# Patient Record
Sex: FEMALE | Race: WHITE | NOT HISPANIC OR LATINO | Employment: OTHER | URBAN - METROPOLITAN AREA
[De-identification: names, ages, dates, MRNs, and addresses within clinical notes are randomized per-mention and may not be internally consistent; named-entity substitution may affect disease eponyms.]

---

## 2017-01-06 ENCOUNTER — HISTORICAL (OUTPATIENT)
Dept: RADIOLOGY | Facility: HOSPITAL | Age: 72
End: 2017-01-06

## 2017-04-18 ENCOUNTER — HISTORICAL (OUTPATIENT)
Dept: RADIOLOGY | Facility: HOSPITAL | Age: 72
End: 2017-04-18

## 2018-05-16 ENCOUNTER — HISTORICAL (OUTPATIENT)
Dept: RADIOLOGY | Facility: HOSPITAL | Age: 73
End: 2018-05-16

## 2018-05-22 ENCOUNTER — HISTORICAL (OUTPATIENT)
Dept: RADIOLOGY | Facility: HOSPITAL | Age: 73
End: 2018-05-22

## 2018-07-05 ENCOUNTER — HISTORICAL (OUTPATIENT)
Dept: RADIOLOGY | Facility: HOSPITAL | Age: 73
End: 2018-07-05

## 2018-07-05 LAB
BUN SERPL-MCNC: 11 MG/DL (ref 7–18)
POC CREATININE: 0.8 MG/DL (ref 0.6–1.3)

## 2018-11-19 ENCOUNTER — HISTORICAL (OUTPATIENT)
Dept: RADIOLOGY | Facility: HOSPITAL | Age: 73
End: 2018-11-19

## 2018-12-05 ENCOUNTER — HISTORICAL (OUTPATIENT)
Dept: RADIOLOGY | Facility: HOSPITAL | Age: 73
End: 2018-12-05

## 2019-05-27 ENCOUNTER — HISTORICAL (OUTPATIENT)
Dept: RADIOLOGY | Facility: HOSPITAL | Age: 74
End: 2019-05-27

## 2019-09-29 ENCOUNTER — HOSPITAL ENCOUNTER (OUTPATIENT)
Dept: MEDSURG UNIT | Facility: HOSPITAL | Age: 74
End: 2019-09-30
Attending: FAMILY MEDICINE | Admitting: FAMILY MEDICINE

## 2019-09-29 LAB
ABS NEUT (OLG): 4.8 X10(3)/MCL (ref 1.5–6.9)
ALBUMIN SERPL-MCNC: 4 GM/DL (ref 3.4–5)
ALBUMIN/GLOB SERPL: 1.1 RATIO
ALP SERPL-CCNC: 67 UNIT/L (ref 30–113)
ALT SERPL-CCNC: 29 UNIT/L (ref 10–45)
APPEARANCE, UA: CLEAR
APTT PPP: 26.8 SECOND(S) (ref 25–35)
AST SERPL-CCNC: 21 UNIT/L (ref 15–37)
BACTERIA SPEC CULT: NORMAL /HPF
BASOPHILS # BLD AUTO: 0.1 X10(3)/MCL (ref 0–0.1)
BASOPHILS NFR BLD AUTO: 2 % (ref 0–1)
BILIRUB SERPL-MCNC: 0.2 MG/DL (ref 0.1–0.9)
BILIRUB UR QL STRIP: NEGATIVE
BILIRUBIN DIRECT+TOT PNL SERPL-MCNC: <0.05 MG/DL (ref 0–0.3)
BILIRUBIN DIRECT+TOT PNL SERPL-MCNC: >0.15 MG/DL
BUN SERPL-MCNC: 13 MG/DL (ref 10–20)
CALCIUM SERPL-MCNC: 9.6 MG/DL (ref 8–10.5)
CHLORIDE SERPL-SCNC: 102 MMOL/L (ref 100–108)
CK MB SERPL-MCNC: 2.7 NG/ML (ref 0–3.6)
CK SERPL-CCNC: 188 UNIT/L (ref 39–225)
CO2 SERPL-SCNC: 30 MMOL/L (ref 21–35)
COLOR UR: ABNORMAL
CREAT SERPL-MCNC: 1.31 MG/DL (ref 0.7–1.3)
D DIMER PPP IA.FEU-MCNC: 0.37 MG/L
EOSINOPHIL # BLD AUTO: 0.5 X10(3)/MCL (ref 0–0.6)
EOSINOPHIL NFR BLD AUTO: 5 % (ref 0–5)
ERYTHROCYTE [DISTWIDTH] IN BLOOD BY AUTOMATED COUNT: 14.6 % (ref 11.5–17)
GLOBULIN SER-MCNC: 3.7 GM/DL
GLUCOSE (UA): NEGATIVE
GLUCOSE SERPL-MCNC: 123 MG/DL (ref 75–116)
HCT VFR BLD AUTO: 41.3 % (ref 36–48)
HGB BLD-MCNC: 14 GM/DL (ref 12–16)
HGB UR QL STRIP: NEGATIVE
IMM GRANULOCYTES # BLD AUTO: 0.02 10*3/UL (ref 0–0.02)
IMM GRANULOCYTES NFR BLD AUTO: 0.2 % (ref 0–0.43)
INR PPP: 0.9 (ref 0–1.2)
KETONES UR QL STRIP: NEGATIVE
LEUKOCYTE ESTERASE UR QL STRIP: ABNORMAL
LYMPHOCYTES # BLD AUTO: 3.3 X10(3)/MCL (ref 0.5–4.1)
LYMPHOCYTES NFR BLD AUTO: 34 % (ref 15–40)
MAGNESIUM SERPL-MCNC: 2.1 MG/DL (ref 1.8–2.4)
MCH RBC QN AUTO: 29 PG (ref 27–34)
MCHC RBC AUTO-ENTMCNC: 34 GM/DL (ref 31–36)
MCV RBC AUTO: 87 FL (ref 80–99)
MONOCYTES # BLD AUTO: 0.9 X10(3)/MCL (ref 0–1.1)
MONOCYTES NFR BLD AUTO: 9 % (ref 4–12)
NEUTROPHILS # BLD AUTO: 4.8 X10(3)/MCL (ref 1.5–6.9)
NEUTROPHILS NFR BLD AUTO: 50 % (ref 43–75)
NITRITE UR QL STRIP: NEGATIVE
PH UR STRIP: 6.5 [PH]
PLATELET # BLD AUTO: 259 X10(3)/MCL (ref 140–400)
PMV BLD AUTO: 9.6 FL (ref 6.8–10)
POC TROPONIN: 0 NG/ML (ref 0–0.08)
POTASSIUM SERPL-SCNC: 3.1 MMOL/L (ref 3.6–5.2)
PROT SERPL-MCNC: 7.7 GM/DL (ref 6.4–8.2)
PROT UR QL STRIP: NEGATIVE
PROTHROMBIN TIME: 9.4 SECOND(S) (ref 9–12)
RBC # BLD AUTO: 4.77 X10(6)/MCL (ref 4.2–5.4)
RBC #/AREA URNS HPF: NORMAL /[HPF]
SODIUM SERPL-SCNC: 141 MMOL/L (ref 135–145)
SP GR UR STRIP: <=1.005
SQUAMOUS EPITHELIAL, UA: NORMAL /LPF
TROPONIN I SERPL-MCNC: <0.017 NG/ML (ref 0–0.06)
TSH SERPL-ACNC: 5.52 MIU/ML (ref 0.36–3.74)
UROBILINOGEN UR STRIP-ACNC: 0.2 EU/DL
WBC # SPEC AUTO: 9.6 X10(3)/MCL (ref 4.5–11.5)
WBC #/AREA URNS HPF: NORMAL /HPF

## 2019-09-30 LAB
BUN SERPL-MCNC: 14 MG/DL (ref 10–20)
CALCIUM SERPL-MCNC: 9.5 MG/DL (ref 8–10.5)
CHLORIDE SERPL-SCNC: 102 MMOL/L (ref 100–108)
CK MB SERPL-MCNC: 2.4 NG/ML (ref 0–3.6)
CK MB SERPL-MCNC: 2.4 NG/ML (ref 0–3.6)
CK SERPL-CCNC: 154 UNIT/L (ref 39–225)
CK SERPL-CCNC: 158 UNIT/L (ref 39–225)
CO2 SERPL-SCNC: 34 MMOL/L (ref 21–35)
CREAT SERPL-MCNC: 1.03 MG/DL (ref 0.7–1.3)
CREAT/UREA NIT SERPL: 14
GLUCOSE SERPL-MCNC: 104 MG/DL (ref 75–116)
POTASSIUM SERPL-SCNC: 3.4 MMOL/L (ref 3.6–5.2)
SODIUM SERPL-SCNC: 142 MMOL/L (ref 135–145)
TROPONIN I SERPL-MCNC: <0.017 NG/ML (ref 0–0.06)
TROPONIN I SERPL-MCNC: <0.017 NG/ML (ref 0–0.06)

## 2019-10-02 LAB — FINAL CULTURE: NO GROWTH

## 2020-06-24 ENCOUNTER — HISTORICAL (OUTPATIENT)
Dept: RADIOLOGY | Facility: HOSPITAL | Age: 75
End: 2020-06-24

## 2020-08-20 ENCOUNTER — HISTORICAL (OUTPATIENT)
Dept: RADIOLOGY | Facility: HOSPITAL | Age: 75
End: 2020-08-20

## 2021-03-17 ENCOUNTER — HISTORICAL (OUTPATIENT)
Dept: RADIOLOGY | Facility: HOSPITAL | Age: 76
End: 2021-03-17

## 2021-06-14 ENCOUNTER — HISTORICAL (OUTPATIENT)
Dept: RADIOLOGY | Facility: HOSPITAL | Age: 76
End: 2021-06-14

## 2021-09-23 ENCOUNTER — HISTORICAL (OUTPATIENT)
Dept: RADIOLOGY | Facility: HOSPITAL | Age: 76
End: 2021-09-23

## 2022-01-11 ENCOUNTER — HISTORICAL (OUTPATIENT)
Dept: RADIOLOGY | Facility: HOSPITAL | Age: 77
End: 2022-01-11

## 2022-04-11 ENCOUNTER — HISTORICAL (OUTPATIENT)
Dept: ADMINISTRATIVE | Facility: HOSPITAL | Age: 77
End: 2022-04-11
Payer: COMMERCIAL

## 2022-04-28 VITALS
HEIGHT: 62 IN | DIASTOLIC BLOOD PRESSURE: 83 MMHG | BODY MASS INDEX: 26.13 KG/M2 | WEIGHT: 142 LBS | SYSTOLIC BLOOD PRESSURE: 138 MMHG | OXYGEN SATURATION: 98 %

## 2022-04-30 NOTE — DISCHARGE SUMMARY
Patient is a 73-year-old white female.  __________ diagnoses include palpitations, tachycardia, chest pressure, hypertension, hypothyroidism.    DISCHARGE DIAGNOSES:  Include:   1. Hypothyroidism with dose of Synthroid changed.  2. Palpitations, resolved.  3. Hypokalemia, resolved.  4. Chronic obstructive pulmonary disease.  5. Tachycardia and palpitations, resolved.  6. Chest pressure, resolved.    HOSPITAL COURSE:  Patient is a 73-year-old white female who presented to the emergency room with history of episode of chest pressure and some tachycardia.  Patient was having this episode for a short amount of time prior.  She had her daughter drive her to the hospital.  AFib was helped by the time she got there.  She was admitted for observation.  Cardiac enzymes were negative.  She was found to be hypokalemic.  This was reconstituted with oral and IV potassium.  She was having cramps in her legs.  This was resolved with the administration of potassium.  She was sent home on oral potassium.  Her thyroid, which she already takes Synthroid for, her TSH was elevated.  Her dose was changed to 125 rather than 100.  Her TSH and potassium are to be checked as an outpatient.  She was having palpitations.  There was nothing shown on her Holter monitor.  She is to follow up with her cardiologist, Dr. Brunner with a Holter monitor prior to her appointment.  She has an appointment in early November.  She was asymptomatic throughout her stay.  Smoking cessation is encouraged.  Patient is not interested in smoking cessation at this time.        PETR   DD: 10/22/2019 0330   DT: 10/22/2019 0343  Job # [id number]     
eyes open, moving, responding to tactile and auditory stimulation appropriately

## 2022-04-30 NOTE — ED PROVIDER NOTES
"   Patient:   Ananya Seasr            MRN: [id number]            FIN: [id number]               Age:   73 years     Sex:  Female     :  1945   Associated Diagnoses:   HTN (hypertension); Palpitations; Chest pressure   Author:   Manuel Sumner MD      Basic Information   Time seen: Date & time 2019 21:14:00.   History source: Patient.   Arrival mode: Private vehicle, walking.   History limitation: None.   Additional information: Chief Complaint from Nursing Triage Note : Chief Complaint   2019 21:04 CDT      Chief Complaint           Took blood pressure at home and was concerned about the heart rate that went from 99 to 130s.  Feeling like a "les in head was pounding".  BP at home was 162/95.  Heart rate stayed in 70s and low 80s throughout triage.  (Modified) .      History of Present Illness   The patient presents with Patient states 2-3 hours ago had an acute onset of palpitations while at rest felt that her heart was racing and that her blood pressure was elevated.  By the time the patient presented emergency department symptoms had eased.  Patient denies chest pain but states she did have some chest pressure and some throat tightness and some dyspnea..  The onset was 2  hours ago.  The course/duration of symptoms is improving.  Character of symptoms fast.  The degree at onset was moderate.  The degree at present is minimal.  The exacerbating factor is none.  The relieving factor is none.  Risk factors consist of hypertension, smoking and COPD.  Prior episodes: none.  Therapy today: none.  Associated symptoms: shortness of breath.        Review of Systems   Constitutional symptoms:  Negative except as documented in HPI.   Skin symptoms:  Negative except as documented in HPI.   Eye symptoms:  Negative except as documented in HPI.   ENMT symptoms:  Negative except as documented in HPI.   Respiratory symptoms:  Negative except as documented in HPI.   Cardiovascular symptoms:  Negative " except as documented in HPI.   Gastrointestinal symptoms:  Negative except as documented in HPI.   Genitourinary symptoms:  Negative except as documented in HPI.   Musculoskeletal symptoms:  Negative except as documented in HPI.   Neurologic symptoms:  Negative except as documented in HPI.   Psychiatric symptoms:  Negative except as documented in HPI.             Additional review of systems information: All other systems reviewed and otherwise negative.      Health Status   Allergies:    Allergic Reactions (Selected)  Severity Not Documented  Augmentin- No reactions were documented.  MetroNIDAZOLE- No reactions were documented.  Penicillins- Sulfa drugs.  Sulfa drugs- No reactions were documented.  Sulfonamides- No reactions were documented.,    Allergies (5) Active Reaction  Augmentin None Documented  metroNIDAZOLE None Documented  penicillins sulfa drugs  sulfa drugs None Documented  sulfonamides None Documented  .   Medications:  (Selected)   Prescriptions  Prescribed  Anoro Ellipta 62.5 mcg-25 mcg/inh inhalation powder: 1 puff(s), INH, Daily, # 30 EA, 4 Refill(s)  CeleBREX 100 mg oral capsule: 100 mg = 1 cap(s), Oral, BID, # 60 cap(s), 2 Refill(s), Pharmacy: [facility]  ProAir HFA 90 mcg/inh inhalation aerosol: See Instructions, INHALE TWO PUFFS BY MOUTH 4 TIMES DAILY, # 9 unknown unit, 2 Refill(s), eRx: [facility]  Vitamin D3 50,000 intl units oral capsule: See Instructions, TAKE ONE CAPSULE BY MOUTH WEEKLY, # 4 cap(s), 11 Refill(s), eRx: [id number]  amLODIPine 5 mg oral tablet: See Instructions, TAKE 1 TABLET BY MOUTH AT BEDTIME, # 30 tab(s), 5 Refill(s), eRx: [facility]  buPROPion 300 mg/24 hours (XL) oral tablet, extended release: See Instructions, TAKE 1 TABLET BY MOUTH ONCE DAILY, # 90 tab(s), 3 Refill(s), eRx: [facility]  diclofenac potassium 50 mg oral tablet: 50 mg = 1 tab(s), Oral, q6hr, # 60 tab(s), 0 Refill(s), Pharmacy: [facility]  #[id number]  donepezil 5 mg oral tablet: 5 mg = 1 tab(s), Oral, Once a day (at bedtime), # 90 tab(s), 0 Refill(s), Pharmacy: [id number]  donepezil 5 mg oral tablet: See Instructions, TAKE ONE TABLET BY MOUTH IN THE AFTERNOON, # 90 tab(s), 3 Refill(s), Pharmacy: [facility]  eszopiclone 1 mg oral tablet: 1 mg = 1 tab(s), Oral, Once a day (at bedtime), # 30 tab(s), 2 Refill(s)  fluocinolone 0.01% otic solution: 5 drop(s), Ear-Left, BID, PRN PRN itching, # 20 mL, 1 Refill(s), Pharmacy: [facility]  hydrochlorothiazide-lisinopril 12.5 mg-20 mg oral tablet: 1 tab(s), Oral, BID, # 60 tab(s), 5 Refill(s), Pharmacy: [id number]  levothyroxine 100 mcg (0.1 mg) oral tablet: See Instructions, TAKE 1 TABLET BY MOUTH ONCE DAILY, # 30 tab(s), 5 Refill(s), Pharmacy: [facility]  pravastatin 40 mg oral tablet: 40 mg = 1 tab(s), Oral, Daily, # 30 tab(s), 5 Refill(s), Pharmacy: [facility]  Documented Medications  Documented  B-50 Complex oral capsule: See Instructions, 1 CAPSULE DAILY, 0 Refill(s)  spironolactone 25 mg oral tablet: See Instructions, 1/2  tab(s) Oral every MONDAY, WEDNESDAY, AND FRIDAY, 0 Refill(s).      Past Medical/ Family/ Social History   Medical history:    Resolved  Hypertension (35736325):  Resolved.  Hip pain (36519215):  Resolved.  COPD (Chronic Obstructive Pulmonary Disease) Assessment Test scale (9793530526):  Resolved..   Surgical history:    Extn - Extraction of tooth ([id number]) in the month of 2017 at 71 Years.  Comments:  2017 9:27 GALI - Milana Duckworth LPN  x6  Cataract extraction ([id number]).  Comments:  2016 9:41 CST - Nikky WRIGHT, Sridevi BILLS  left  CHRIS BSO - Total abdominal hysterectomy and bilateral salpingo-oophorectomy ([id number]).   section ([id number]).  Cholecystectomy (81454974).  Appendectomy (893710317).  Tonsillectomy and adenoidectomy (112918263).  basel cell removal..   Family history:     CAD - Coronary artery disease  Mother  Father  Hypertension.  Mother  .   Social history:    Social & Psychosocial Habits    Alcohol    Comment: yogeshies - 02/29/2016 09:43 - Sridevi Sher LPN    10/26/2014 Risk Assessment: Denies Alcohol Use    Employment/School  06/20/2016  Status: Retired    Exercise    Comment: as tolerated - 06/20/2016 14:16 - Milana Duckworth LPN    Home/Environment  06/22/2016  Living situation: Home/Independent    Home equipment: Respiratory treatments    Alcohol abuse in household: No    Substance abuse in household: No    Smoker in household: No    Injuries/Abuse/Neglect in household: No    Feels unsafe at home: No    Safe place to go: Yes    Family/Friends available to help: Yes    Concern for family members at home: No    Nutrition/Health  06/20/2016  Type of diet: Regular    Sexual  06/22/2016  Sexually active: Yes    Substance Use    Comment: curry - 02/29/2016 09:43 - Sridevi Sher LPN    10/26/2014 Risk Assessment: Denies Substance Abuse    Tobacco  10/26/2014 Risk Assessment: Denies Tobacco Use    10/04/2016  Use: Former smoker    Type: Cigarettes    Tobacco use per day: 10    09/29/2019  Use: 10 or more cigarettes (1/    Patient Wants Consult For Cessation Counseling No    Abuse/Neglect  09/29/2019  SHX Any signs of abuse or neglect No  , Tobacco use: Regularly.   Problem list:    Active Problems (9)  Abdominal pain   COPD - Chronic obstructive pulmonary disease   Depression   Hyperlipidemia   Hypertension   Hypothyroidism   Mild cognitive impairment   Tobacco user   Vitamin D deficiency   .      Physical Examination               Vital Signs   Vital Signs   9/29/2019 21:04 CDT      Temperature Oral          36.7 DegC                             Temperature Oral (calculated)             98.06 DegF                             Peripheral Pulse Rate     77 bpm                             Respiratory Rate          20 br/min                             SpO2                       95 %                             Oxygen Therapy            Room air                             Systolic Blood Pressure   192 mmHg  HI                             Diastolic Blood Pressure  78 mmHg  .      Vital Signs (last 24 hrs)_____  Last Charted___________  Temp Oral     36.7 DegC  (SEP 29 21:04)  Heart Rate Peripheral   77 bpm  (SEP 29 21:04)  Resp Rate         20 br/min  (SEP 29 21:04)  SBP      H 192mmHg  (SEP 29 21:04)  DBP      78 mmHg  (SEP 29 21:04)  SpO2      95 %  (SEP 29 21:04)  Weight      65 kg  (SEP 29 21:04)  Height      159 cm  (SEP 29 21:04)  BMI      25.71  (SEP 29 21:04)  .   Measurements   9/29/2019 21:04 CDT      Weight Dosing             65 kg                             Weight Measured           65 kg                             Weight Measured and Calculated in Lbs     143.30 lb                             Height/Length Dosing      159 cm                             Height/Length Measured    159 cm                             Body Mass Index Measured  25.71 kg/m2  .   Basic Oxygen Information   9/29/2019 21:04 CDT      SpO2                      95 %                             Oxygen Therapy            Room air  .   General:  Alert, no acute distress.    Skin:  Warm, dry, pink, intact.    Head:  Normocephalic, atraumatic.    Neck:  Supple, trachea midline, no tenderness, no JVD.    Eye:  Pupils are equal, round and reactive to light, extraocular movements are intact, normal conjunctiva.    Cardiovascular:  Regular rate and rhythm, No murmur, Normal peripheral perfusion, No edema.    Respiratory:  Lungs are clear to auscultation, respirations are non-labored, breath sounds are equal.    Chest wall:  No tenderness, No deformity.    Back:  Nontender, Normal range of motion, Normal alignment.    Musculoskeletal:  Normal ROM, normal strength, no tenderness, no swelling, no deformity.    Gastrointestinal:  Soft, Nontender, Non distended, Normal bowel sounds, No organomegaly.    Neurological:   Alert and oriented to person, place, time, and situation, No focal neurological deficit observed.    Lymphatics:  No lymphadenopathy.   Psychiatric:  Cooperative, appropriate mood & affect, normal judgment.       Medical Decision Making   Orders  Launch Orders   Laboratory:  BNP-Pro (Order): Stat collect, 9/29/2019 21:28 CDT, Blood, Lab Collect, 9/29/2019 21:28 CDT  D-Dimer (Order): Stat collect, 9/29/2019 21:28 CDT, Blood, Lab Collect, 9/29/2019 21:28 CDT  Urinalysis with Microscopic if Indicated (Order): Stat collect, Urine, 9/29/2019 21:28 CDT, Nurse collect  POC iSTAT ER Troponin request (Order): Blood, Stat collect, 9/29/2019 21:28 CDT, Nurse collect, Print Label By Order Location  Magnesium Level (Order): Stat collect, 9/29/2019 21:28 CDT, Blood, Lab Collect, 9/29/2019 21:28 CDT  CK MB (Order): Stat collect, 9/29/2019 21:28 CDT, Blood, Lab Collect, 9/29/2019 21:28 CDT  CK (Order): Stat collect, 9/29/2019 21:28 CDT, Blood, Lab Collect, 9/29/2019 21:28 CDT  PTT (Order): Stat collect, 9/29/2019 21:28 CDT, Blood, Lab Collect, 9/29/2019 21:28 CDT  INR - Protime (Order): Stat collect, 9/29/2019 21:28 CDT, Blood, Lab Collect, 9/29/2019 21:28 CDT  CMP (Order): Stat collect, 9/29/2019 21:28 CDT, Blood, Lab Collect, 9/29/2019 21:28 CDT  CBC w/ Auto Diff (Order): Now collect, 9/29/2019 21:28 CDT, Blood, Lab Collect, 9/29/2019 21:28 CDT  Radiology:  XR Chest 1 View (Order): Stat, 9/29/2019 21:28 CDT, Chest Pain, None, Stretcher, Rad Type, Not Scheduled  Cardiology:  EKG Adult 12 Lead (Order): 9/29/2019 21:28 CDT, Stat, -1, -1, 9/29/2019 21:28 CDT.   Electrocardiogram:  Time 9/29/2019 21:46:00, rate 67, normal sinus rhythm, STT segments Non specific changes, QRS interval Incomplete right bundle-branch block, Previous EKG available None available.    Results review:     No qualifying data available.   Radiology results:  09/29/2019 23:00; Burak CONNOLLY, Manuel MILES; ; Nothing acute.      Reexamination/ Reevaluation   Vital signs    Basic Oxygen Information   9/29/2019 21:04 CDT      SpO2                      95 %                             Oxygen Therapy            Room air        Impression and Plan   Diagnosis   HTN (hypertension) (GQL41-XR I10)   Palpitations (XLV54-LS R00.2)   Chest pressure (IKQ14-TV R07.89)      Calls-Consults   -  9/29/2019 23:10:00 , Yojana PALOMINO MD, Gideon Dudley to admit.    Plan   Condition: Improved, Stable.    Disposition: Admit time  9/29/2019 23:10:00, Place in Observation Telemetry Unit.    Counseled: Patient, Family, Regarding diagnosis, Regarding diagnostic results, Regarding treatment plan, Patient indicated understanding of instructions.    Orders: Launch Orders   Laboratory:  CPK (Order): Timed collect, 9/30/2019 5:00 CDT, Blood, q6hr for 3 dose(s), Stop date 9/30/2019 22:59 CDT, Lab Collect, 9/30/2019 5:00 CDT  Troponin-I (Order): Timed collect, 9/30/2019 5:00 CDT, Blood, q6hr for 3 dose(s), Stop date 9/30/2019 22:59 CDT, Lab Collect, 9/30/2019 5:00 CDT  CK MB (Order): Timed collect, 9/30/2019 5:00 CDT, Blood, q6hr for 3 dose(s), Stop date 9/30/2019 22:59 CDT, Lab Collect, 9/30/2019 5:00 CDT  Admit/Transfer/Discharge:  Place in Outpatient Observation (Order): 9/29/2019 23:11 CDT, Telemetry with Monitor Juno CONNOLLY, Britt Louie, No.

## 2022-05-04 NOTE — HISTORICAL [facility] CERNER
This is a historical note converted from Teresita. Formatting and pictures may have been removed.  Please reference Teresita for original formatting and attached multimedia. Chief Complaint  Took blood pressure at home and was concerned about the heart rate that went from 99 to 130s. ?Feeling like a les in head was pounding. ?BP at home was 162/95. ?Heart rate stayed in 70s and low 80s throughout triage.  History of Present Illness  Mr. Zuniga is a 73-year-old white female has a history of hypertension?she also has a history of hyperlipidemia.? She does see Dr. Brunner as her cardiologist. ?She was at home she began to feel like her she had a throbbing in her head. ?She felt like she had palpitations. ?She took her pulse and it was around 100. ?This continued and her pulse was around 130. ?She was brought to the ER by private car.? Her?pulse had?come down. ?Her blood pressure was elevated.? She did not require anything to bring down her pulse. ?She was put in for observation. ?She was placed on telemetry. ?She did not have any more runs of elevated pulse?during her stay. ?Her TSH was noted to be elevated at 5. ?She was placed on metoprolol during her stay. ?Her potassium was low. ?At 3.? She did note some cramping to her legs.? She does take lisinopril hydrochlorothiazide. ?She may be depleted in her potassium secondary to the hydrochlorothiazide.  Review of Systems  Constitutional:?no fever, posfatigue,? positive weakness  Eye:?no vision loss, eye redness, drainage, or pain  ENMT:?no sore throat, ear pain, sinus pain/congestion, nasal congestion/drainage  Respiratory:?no cough, no wheezing, no shortness of breath  Cardiovascular:?no chest pain,?+ palpitations, no edemap positive pounding in head? when her? heartbeat was? high  Gastrointestinal:?no nausea, vomiting, or diarrhea. No abdominal pain  Genitourinary:?no dysuria, no urinary frequency or urgency, no hematuria  Hema/Lymph:?no abnormal bruising or  bleeding  Endocrine:?no heat or cold intolerance, no excessive thirst or excessive urination  Musculoskeletal:?no muscle or joint pain, no joint swelling  Integumentary:?no skin rash or abnormal lesion  Neurologic: no headache, no dizziness, no weakness or numbness  ?  Physical Exam  General:?well-developed well-nourished in no acute distress  Eye: PERRLA, EOMI, clear conjunctiva, eyelids normal  HENT:?TMs/ear canals clear, oropharynx without erythema/exudate, oropharynx and nasal mucosal surfaces moist, no maxillary/frontal sinus tenderness to palpation  Neck: full range of motion, no thyromegaly or lymphadenopathy  Respiratory:?clear to auscultation bilaterally  Cardiovascular:?regular rate and rhythm without murmurs, gallops or rubs  Gastrointestinal:?soft, non-tender, non-distended with normal bowel sounds, without masses to palpation  Genitourinary: no CVA tenderness to palpation  Musculoskeletal:?full range of motion of all extremities/spine without limitation or discomfort  Integumentary: no rashes or skin lesions present  Neurologic: cranial nerves intact, no signs of peripheral neurological deficit, motor/sensory function intact  ?  Assessment/Plan  1.?Hypokalemic syndrome?E87.6  ?Replacing her potassium with oral potassium  2.?Hypothyroid?E03.9  She is already on levothyroxine 100. ?She is not fully supplemented her TSH is?greater than 5 will increase to 125  3.?COPD - Chronic obstructive pulmonary disease?J44.9  Continue inhalers  4.?Tobacco user?Z72.0  Encouraged to quit smoking,?she assures me that this will probably not happen  5.?Palpitation?R00.2  She had some tachycardia?we will start her on metoprolol, Holter as an outpatient, follow-up with her cardiologist Dr. Brunner  6.?Tachycardia?R00.0  See above   Problem List/Past Medical History  Ongoing  Abdominal pain  COPD - Chronic obstructive pulmonary disease  Depression  Hyperlipidemia  Hypertension  Hypothyroidism  Mild cognitive impairment  Tobacco  user  Vitamin D deficiency  Historical  COPD (Chronic Obstructive Pulmonary Disease) Assessment Test scale  Hip pain  Hypertension  Procedure/Surgical History  Extn - Extraction of tooth ()  Closed reduction of dislocation of foot and toe (10/26/2014)  Closed treatment of metatarsophalangeal joint dislocation; without anesthesia. (10/26/2014)  Appendectomy  basel cell removal  Cataract extraction   section  Cholecystectomy  CHRIS BSO - Total abdominal hysterectomy and bilateral salpingo-oophorectomy  Tonsillectomy and adenoidectomy   Medications  Inpatient  No active inpatient medications  Home  amLODIPine 5 mg oral tablet, See Instructions, 5 refills  Anoro Ellipta 62.5 mcg-25 mcg/inh inhalation powder, 1 puff(s), INH, Daily, 4 refills  B-50 Complex oral capsule, See Instructions  biotin 1000 mcg oral tablet, 1000 mcg= 1 tab(s), Oral, Daily  buPROPion 300 mg/24 hours (XL) oral tablet, extended release, See Instructions, 3 refills  donepezil 5 mg oral tablet, See Instructions, 3 refills  donepezil 5 mg oral tablet, 5 mg= 1 tab(s), Oral, Daily  fluocinolone 0.01% otic solution, 5 drop(s), Ear-Left, BID, PRN, 1 refills  hydrochlorothiazide-lisinopril 12.5 mg-20 mg oral tablet, 1 tab(s), Oral, BID, 5 refills  levothyroxine 100 mcg (0.1 mg) oral tablet, See Instructions, 5 refills  methylsulfonylmethane, 1000 mg, Oral, Daily  Omega-3 oral capsule, 1280 mg, Oral, Daily  potassium chloride 15 mEq oral tablet, extended release, 15 mEq= 1 tab(s), Oral, Daily  ProAir HFA 90 mcg/inh inhalation aerosol, See Instructions, 2 refills,? ?Not taking  Allergies  Augmentin  metroNIDAZOLE  penicillins?(sulfa drugs)  sulfa drugs  sulfonamides  Social History  Abuse/Neglect  No, 2019  No, 2019  Alcohol - Denies Alcohol Use, 10/26/2014  Employment/School  Retired, 2016  Exercise  Home/Environment  Living situation: Home/Independent. Home equipment: Respiratory treatments. Alcohol abuse in household: No.  Substance abuse in household: No. Smoker in household: No. Injuries/Abuse/Neglect in household: No. Feels unsafe at home: No. Safe place to go: Yes. Family/Friends available for support: Yes. Concern for family members at home: No., 06/22/2016  Nutrition/Health  Regular, 06/20/2016  Sexual  Sexually active: Yes., 06/22/2016  Substance Use - Denies Substance Abuse, 10/26/2014  Tobacco - Denies Tobacco Use, 10/26/2014  10 or more cigarettes (1/2 pack or more)/day in last 30 days, No, 09/30/2019  10 or more cigarettes (1/2 pack or more)/day in last 30 days, No, 09/29/2019  Former smoker, Cigarettes, 10 per day., 10/04/2016  Family History  CAD - Coronary artery disease: Mother and Father.  Hypertension.: Mother.  Immunizations  Vaccine Date Status Comments   influenza virus vaccine, inactivated - Not Given Patient Refuses

## 2022-12-30 DIAGNOSIS — Z87.891 HISTORY OF TOBACCO USE: Primary | ICD-10-CM

## 2023-01-12 ENCOUNTER — HOSPITAL ENCOUNTER (OUTPATIENT)
Dept: RADIOLOGY | Facility: HOSPITAL | Age: 78
Discharge: HOME | End: 2023-01-12
Attending: FAMILY MEDICINE
Payer: MEDICARE

## 2023-01-12 DIAGNOSIS — Z87.891 HISTORY OF TOBACCO USE: ICD-10-CM

## 2023-01-12 PROCEDURE — 71271 CT THORAX LUNG CANCER SCR C-: CPT | Mod: TC

## 2023-01-30 DIAGNOSIS — Z12.11 SCREENING FOR COLON CANCER: Primary | ICD-10-CM

## 2023-01-31 ENCOUNTER — ANESTHESIA EVENT (OUTPATIENT)
Dept: SURGERY | Facility: HOSPITAL | Age: 78
End: 2023-01-31
Payer: MEDICARE

## 2023-01-31 ENCOUNTER — CLINICAL SUPPORT (OUTPATIENT)
Dept: RESPIRATORY THERAPY | Facility: HOSPITAL | Age: 78
End: 2023-01-31
Attending: INTERNAL MEDICINE
Payer: MEDICARE

## 2023-01-31 DIAGNOSIS — Z12.11 SCREENING FOR COLON CANCER: ICD-10-CM

## 2023-01-31 PROCEDURE — 93005 ELECTROCARDIOGRAM TRACING: CPT

## 2023-01-31 PROCEDURE — 93010 ELECTROCARDIOGRAM REPORT: CPT | Mod: ,,, | Performed by: INTERNAL MEDICINE

## 2023-01-31 PROCEDURE — 93010 EKG 12-LEAD: ICD-10-PCS | Mod: ,,, | Performed by: INTERNAL MEDICINE

## 2023-01-31 RX ORDER — MAGNESIUM 250 MG
1 TABLET ORAL EVERY MORNING
COMMUNITY

## 2023-01-31 RX ORDER — VITAMIN B COMPLEX
1 CAPSULE ORAL EVERY MORNING
COMMUNITY

## 2023-01-31 RX ORDER — ALBUTEROL SULFATE 90 UG/1
2 AEROSOL, METERED RESPIRATORY (INHALATION) EVERY 6 HOURS PRN
COMMUNITY
Start: 2023-01-25

## 2023-01-31 RX ORDER — DICLOFENAC POTASSIUM 50 MG/1
50 TABLET, FILM COATED ORAL 2 TIMES DAILY
COMMUNITY
Start: 2022-11-21

## 2023-01-31 RX ORDER — LEVOTHYROXINE SODIUM 100 UG/1
1 TABLET ORAL EVERY MORNING
COMMUNITY
Start: 2023-01-23

## 2023-01-31 RX ORDER — SPIRONOLACTONE 25 MG/1
25 TABLET ORAL EVERY MORNING
COMMUNITY
Start: 2022-12-22

## 2023-01-31 RX ORDER — ROPINIROLE 0.25 MG/1
1 TABLET, FILM COATED ORAL NIGHTLY
COMMUNITY
Start: 2023-01-23

## 2023-01-31 RX ORDER — SERTRALINE HYDROCHLORIDE 25 MG/1
25 TABLET, FILM COATED ORAL EVERY MORNING
COMMUNITY
Start: 2022-11-30

## 2023-01-31 RX ORDER — DONEPEZIL HYDROCHLORIDE 5 MG/1
1 TABLET, FILM COATED ORAL NIGHTLY
COMMUNITY
Start: 2023-01-10

## 2023-01-31 RX ORDER — ASCORBIC ACID 500 MG
500 TABLET ORAL EVERY MORNING
COMMUNITY

## 2023-01-31 RX ORDER — ACETAMINOPHEN 500 MG
2000 TABLET ORAL EVERY MORNING
COMMUNITY

## 2023-01-31 RX ORDER — LISINOPRIL AND HYDROCHLOROTHIAZIDE 12.5; 2 MG/1; MG/1
1 TABLET ORAL 2 TIMES DAILY
COMMUNITY
Start: 2022-12-05

## 2023-01-31 RX ORDER — CARVEDILOL 3.12 MG/1
1 TABLET ORAL 2 TIMES DAILY
COMMUNITY
Start: 2023-01-03

## 2023-01-31 NOTE — ANESTHESIA PREPROCEDURE EVALUATION
01/31/2023  Ananya Sears is a 77 y.o., female.      Pre-op Assessment    I have reviewed the Patient Summary Reports.    I have reviewed the NPO Status.   I have reviewed the Medications.     Review of Systems  Anesthesia Hx:  No problems with previous Anesthesia  Neg history of prior surgery. Denies Family Hx of Anesthesia complications.   Denies Personal Hx of Anesthesia complications.   Social:  Non-Smoker    Hematology/Oncology:  Hematology Normal   Oncology Normal     EENT/Dental:EENT/Dental Normal   Cardiovascular:   Hypertension    Pulmonary:   COPD    Hepatic/GI:  Hepatic/GI Normal    Musculoskeletal:  Musculoskeletal Normal    Neurological:   RLS   Endocrine:   Hypothyroidism    Dermatological:  Skin Normal    Psych:   Psychiatric History depression          Physical Exam  General: Cooperative and Alert    Airway:  Mallampati: I   Mouth Opening: Normal  TM Distance: Normal  Tongue: Normal  Neck ROM: Normal ROM    Dental:  Intact        Anesthesia Plan  Type of Anesthesia, risks & benefits discussed:    Anesthesia Type: Gen Natural Airway  Intra-op Monitoring Plan: Standard ASA Monitors  Post Op Pain Control Plan: multimodal analgesia  Induction:  IV  Informed Consent: Informed consent signed with the Patient and all parties understand the risks and agree with anesthesia plan.  All questions answered. Patient consented to blood products? Yes  ASA Score: 3  Day of Surgery Review of History & Physical: I have interviewed and examined the patient. I have reviewed the patient's H&P dated: There are no significant changes.     Ready For Surgery From Anesthesia Perspective.     .

## 2023-02-02 ENCOUNTER — HOSPITAL ENCOUNTER (OUTPATIENT)
Facility: HOSPITAL | Age: 78
Discharge: HOME | End: 2023-02-02
Attending: INTERNAL MEDICINE | Admitting: INTERNAL MEDICINE
Payer: MEDICARE

## 2023-02-02 ENCOUNTER — ANESTHESIA (OUTPATIENT)
Dept: SURGERY | Facility: HOSPITAL | Age: 78
End: 2023-02-02
Payer: MEDICARE

## 2023-02-02 VITALS
RESPIRATION RATE: 18 BRPM | HEART RATE: 60 BPM | OXYGEN SATURATION: 95 % | HEIGHT: 62 IN | BODY MASS INDEX: 26.13 KG/M2 | WEIGHT: 142 LBS | DIASTOLIC BLOOD PRESSURE: 74 MMHG | SYSTOLIC BLOOD PRESSURE: 167 MMHG

## 2023-02-02 DIAGNOSIS — Z12.11 SCREENING FOR COLON CANCER: ICD-10-CM

## 2023-02-02 PROCEDURE — 37000008 HC ANESTHESIA 1ST 15 MINUTES: Performed by: INTERNAL MEDICINE

## 2023-02-02 PROCEDURE — 37000009 HC ANESTHESIA EA ADD 15 MINS: Performed by: INTERNAL MEDICINE

## 2023-02-02 PROCEDURE — 63600175 PHARM REV CODE 636 W HCPCS: Performed by: NURSE ANESTHETIST, CERTIFIED REGISTERED

## 2023-02-02 PROCEDURE — G0121 COLON CA SCRN NOT HI RSK IND: HCPCS | Performed by: INTERNAL MEDICINE

## 2023-02-02 PROCEDURE — 25000003 PHARM REV CODE 250: Performed by: NURSE ANESTHETIST, CERTIFIED REGISTERED

## 2023-02-02 PROCEDURE — 63600175 PHARM REV CODE 636 W HCPCS: Performed by: ANESTHESIOLOGY

## 2023-02-02 RX ORDER — LIDOCAINE HYDROCHLORIDE 20 MG/ML
INJECTION INTRAVENOUS
Status: DISCONTINUED | OUTPATIENT
Start: 2023-02-02 | End: 2023-02-02

## 2023-02-02 RX ORDER — PROPOFOL 10 MG/ML
VIAL (ML) INTRAVENOUS
Status: DISCONTINUED | OUTPATIENT
Start: 2023-02-02 | End: 2023-02-02

## 2023-02-02 RX ORDER — SODIUM CHLORIDE, SODIUM LACTATE, POTASSIUM CHLORIDE, CALCIUM CHLORIDE 600; 310; 30; 20 MG/100ML; MG/100ML; MG/100ML; MG/100ML
INJECTION, SOLUTION INTRAVENOUS CONTINUOUS
Status: ACTIVE | OUTPATIENT
Start: 2023-02-02

## 2023-02-02 RX ADMIN — LIDOCAINE HYDROCHLORIDE 100 MG: 20 INJECTION, SOLUTION INTRAVENOUS at 10:02

## 2023-02-02 RX ADMIN — PROPOFOL 20 MG: 10 INJECTION, EMULSION INTRAVENOUS at 10:02

## 2023-02-02 RX ADMIN — PROPOFOL 60 MG: 10 INJECTION, EMULSION INTRAVENOUS at 10:02

## 2023-02-02 RX ADMIN — SODIUM CHLORIDE, POTASSIUM CHLORIDE, SODIUM LACTATE AND CALCIUM CHLORIDE: 600; 310; 30; 20 INJECTION, SOLUTION INTRAVENOUS at 09:02

## 2023-02-02 NOTE — OP NOTE
[facility]  Operative Note    Date of Procedure: 2/2/2023     Procedure: Procedure(s) (LRB):  COLONOSCOPY (N/A)   Colonoscopy    Surgeon(s) and Role:     * Lonnie Dial III, MD - Primary    Assisting Surgeon: None    Pre-Operative Diagnosis: Screen for colon cancer [Z12.11]  Screening for colon cancer.    Post-Operative Diagnosis: Post-Op Diagnosis Codes:     * Screen for colon cancer [Z12.11]  Moderate diverticular disease left side without perforation without bleed.  Tortuous colon/suboptimal prep.    Endoscopic Specimens:  * No specimens in log *      Anesthesia: General/MAC    Consent:  Patient was consented for the procedure at my office.  The risks and benefits of procedure explained in detail.  They were willing to undergo those risks.    HPI & Operative Findings (including complications, if any):  This is a 77-year-old white female with a history of average colorectal cancer risk.  No family history murmurs colon cancer.  She is had occasional would frequent diarrhea over her lifetime.  No other alarm symptoms.    She was consented for the procedure prior to my office.  Risks and benefits procedure explained in detail.  She was willing to undergo the risks.  Berenice Urena CRNA present.  This morning please see his documentation for medications administered.      The patient was brought down into the endoscopy suite.  Laid in left lateral decubitus position rectal exam is performed was within normal limits.  The Olympus colonoscope was then passed into a segment of tortuosity in the left-sided colon.  She would some diverticular disease on this side.  Ultimately after several maneuvers I was able to get to the right side of the colon and get into the cecum but I could not terminate into the terminal ileum.  I could see its orifice but there were no masses polyps or any mucosal changes to the cecum.  Prep was suboptimal.      The scope was pulled back in 360 degree  circumferential fashion.  Multiple views were taken of the flexures.  There were no abnormalities noted to the cecum ascending colon hepatic flexure transverse colon splenic flexure descending.  There was increasing gradient of diverticular disease on the left side without perforation without bleed.  The sigmoid was also within normal limits except for some diverticular disease proximally.  No masses no polyps.  Scope retroflexion the rectum was normal.  Scope was withdrawn patient tolerated the procedure well without any complications.      Discussion:    Repeat colonoscopy only if clinically indicated.  Continue low residue diet.    Blood Loss (EBL): * No values recorded between 2/2/2023 10:31 AM and 2/2/2023 11:05 AM *           Implants: * No implants in log *    Specimens:   Specimen (24h ago, onward)      None                    Condition: Stable for Discharge    Disposition: Home or Self Care        Discharge Note    OUTCOME: Patient tolerated treatment/procedure well without complication and is now ready for discharge.    DISPOSITION: Home or Self Care    FINAL DIAGNOSIS:  <principal problem not specified>    FOLLOWUP: Repeat endoscopy per physican/ACG guidelines determined at time of the procedure    DISCHARGE INSTRUCTIONS:  No discharge procedures on file.

## 2023-02-02 NOTE — ANESTHESIA POSTPROCEDURE EVALUATION
Anesthesia Post Evaluation    Patient: Ananya Sears    Procedure(s) Performed: Procedure(s) (LRB):  COLONOSCOPY (N/A)    Final Anesthesia Type: general      Patient location during evaluation: OPS  Patient participation: Yes- Able to Participate  Level of consciousness: awake  Post-procedure vital signs: reviewed and stable  Pain management: adequate  Airway patency: patent  DAVIS mitigation strategies: Multimodal analgesia  PONV status at discharge: No PONV  Anesthetic complications: no      Cardiovascular status: hemodynamically stable  Respiratory status: unassisted, room air and spontaneous ventilation  Hydration status: euvolemic  Follow-up not needed.          Vitals Value Taken Time   /65 02/02/23 0849   Temp 37 02/02/23 1103   Pulse 61 02/02/23 0849   Resp 18 02/02/23 0849   SpO2 95 % 02/02/23 0849         No case tracking events are documented in the log.      Pain/Albert Score: No data recorded

## 2023-02-17 ENCOUNTER — HOSPITAL ENCOUNTER (OUTPATIENT)
Dept: RADIOLOGY | Facility: HOSPITAL | Age: 78
Discharge: HOME | End: 2023-02-17
Attending: FAMILY MEDICINE
Payer: MEDICARE

## 2023-02-17 DIAGNOSIS — Z12.31 BREAST CANCER SCREENING BY MAMMOGRAM: ICD-10-CM

## 2023-02-17 PROCEDURE — 77063 MAMMO DIGITAL SCREENING BILAT WITH TOMO: ICD-10-PCS | Mod: 26,,, | Performed by: STUDENT IN AN ORGANIZED HEALTH CARE EDUCATION/TRAINING PROGRAM

## 2023-02-17 PROCEDURE — 77063 BREAST TOMOSYNTHESIS BI: CPT | Mod: 26,,, | Performed by: STUDENT IN AN ORGANIZED HEALTH CARE EDUCATION/TRAINING PROGRAM

## 2023-02-17 PROCEDURE — 77067 SCR MAMMO BI INCL CAD: CPT | Mod: 26,,, | Performed by: STUDENT IN AN ORGANIZED HEALTH CARE EDUCATION/TRAINING PROGRAM

## 2023-02-17 PROCEDURE — 77067 MAMMO DIGITAL SCREENING BILAT WITH TOMO: ICD-10-PCS | Mod: 26,,, | Performed by: STUDENT IN AN ORGANIZED HEALTH CARE EDUCATION/TRAINING PROGRAM

## 2023-02-17 PROCEDURE — 77067 SCR MAMMO BI INCL CAD: CPT | Mod: TC

## 2023-04-14 DIAGNOSIS — R42 DIZZINESS AND GIDDINESS: Primary | ICD-10-CM

## 2023-04-19 ENCOUNTER — HOSPITAL ENCOUNTER (OUTPATIENT)
Dept: RADIOLOGY | Facility: HOSPITAL | Age: 78
Discharge: HOME | End: 2023-04-19
Attending: FAMILY MEDICINE
Payer: MEDICARE

## 2023-04-19 DIAGNOSIS — R42 DIZZINESS AND GIDDINESS: ICD-10-CM

## 2023-04-19 PROCEDURE — 70450 CT HEAD/BRAIN W/O DYE: CPT | Mod: TC

## 2023-06-19 DIAGNOSIS — I65.23 CAROTID STENOSIS, BILATERAL: Primary | ICD-10-CM

## 2023-07-11 ENCOUNTER — HOSPITAL ENCOUNTER (OUTPATIENT)
Dept: RADIOLOGY | Facility: HOSPITAL | Age: 78
Discharge: HOME | End: 2023-07-11
Attending: INTERNAL MEDICINE
Payer: MEDICARE

## 2023-07-11 DIAGNOSIS — I65.23 CAROTID STENOSIS, BILATERAL: ICD-10-CM

## 2023-07-11 PROCEDURE — 93880 EXTRACRANIAL BILAT STUDY: CPT | Mod: TC

## 2023-09-15 ENCOUNTER — HOSPITAL ENCOUNTER (OUTPATIENT)
Dept: RADIOLOGY | Facility: HOSPITAL | Age: 78
Discharge: HOME | End: 2023-09-15
Attending: FAMILY MEDICINE
Payer: MEDICARE

## 2023-09-15 DIAGNOSIS — R52 PAIN: ICD-10-CM

## 2023-09-15 PROCEDURE — 71046 X-RAY EXAM CHEST 2 VIEWS: CPT | Mod: TC

## 2023-09-15 PROCEDURE — 74019 RADEX ABDOMEN 2 VIEWS: CPT | Mod: TC

## 2023-10-10 ENCOUNTER — HOSPITAL ENCOUNTER (OUTPATIENT)
Dept: RADIOLOGY | Facility: HOSPITAL | Age: 78
Discharge: HOME | End: 2023-10-10
Attending: FAMILY MEDICINE
Payer: MEDICARE

## 2023-10-10 DIAGNOSIS — R42 DIZZINESS AND GIDDINESS: ICD-10-CM

## 2023-10-10 PROCEDURE — 70450 CT HEAD/BRAIN W/O DYE: CPT | Mod: TC

## 2023-12-29 DIAGNOSIS — Z87.891 PERSONAL HISTORY OF TOBACCO USE, PRESENTING HAZARDS TO HEALTH: Primary | ICD-10-CM

## 2024-02-09 DIAGNOSIS — J44.9 COPD (CHRONIC OBSTRUCTIVE PULMONARY DISEASE): Primary | ICD-10-CM

## 2024-07-19 ENCOUNTER — HOSPITAL ENCOUNTER (OUTPATIENT)
Dept: RADIOLOGY | Facility: HOSPITAL | Age: 79
Discharge: HOME | End: 2024-07-19
Attending: FAMILY MEDICINE
Payer: MEDICARE

## 2024-07-19 DIAGNOSIS — J44.1 CHRONIC OBSTRUCTIVE PULMONARY DISEASE WITH (ACUTE) EXACERBATION: ICD-10-CM

## 2024-07-19 PROCEDURE — 71046 X-RAY EXAM CHEST 2 VIEWS: CPT | Mod: TC

## 2024-10-03 DIAGNOSIS — R31.29 MICROSCOPIC HEMATURIA: Primary | ICD-10-CM

## 2024-10-07 ENCOUNTER — HOSPITAL ENCOUNTER (OUTPATIENT)
Dept: RADIOLOGY | Facility: HOSPITAL | Age: 79
Discharge: HOME | End: 2024-10-07
Attending: FAMILY MEDICINE
Payer: MEDICARE

## 2024-10-07 DIAGNOSIS — R31.29 MICROSCOPIC HEMATURIA: ICD-10-CM

## 2024-10-07 PROCEDURE — 76770 US EXAM ABDO BACK WALL COMP: CPT | Mod: TC

## 2025-03-01 ENCOUNTER — HOSPITAL ENCOUNTER (EMERGENCY)
Facility: HOSPITAL | Age: 80
Discharge: HOME | End: 2025-03-01
Attending: EMERGENCY MEDICINE
Payer: MEDICARE

## 2025-03-01 VITALS
SYSTOLIC BLOOD PRESSURE: 155 MMHG | WEIGHT: 143 LBS | DIASTOLIC BLOOD PRESSURE: 71 MMHG | HEIGHT: 62 IN | HEART RATE: 75 BPM | OXYGEN SATURATION: 96 % | RESPIRATION RATE: 20 BRPM | TEMPERATURE: 100 F | BODY MASS INDEX: 26.31 KG/M2

## 2025-03-01 DIAGNOSIS — J10.1 INFLUENZA A: Primary | ICD-10-CM

## 2025-03-01 DIAGNOSIS — J44.1 COPD WITH ACUTE EXACERBATION: ICD-10-CM

## 2025-03-01 LAB
FLUAV AG UPPER RESP QL IA.RAPID: DETECTED
FLUBV AG UPPER RESP QL IA.RAPID: NOT DETECTED
RSV A 5' UTR RNA NPH QL NAA+PROBE: NOT DETECTED
SARS-COV-2 RNA RESP QL NAA+PROBE: NOT DETECTED

## 2025-03-01 PROCEDURE — 25000003 PHARM REV CODE 250: Performed by: EMERGENCY MEDICINE

## 2025-03-01 PROCEDURE — 99284 EMERGENCY DEPT VISIT MOD MDM: CPT | Mod: 25

## 2025-03-01 PROCEDURE — 0241U COVID/RSV/FLU A&B PCR: CPT | Performed by: EMERGENCY MEDICINE

## 2025-03-01 PROCEDURE — 25000242 PHARM REV CODE 250 ALT 637 W/ HCPCS: Performed by: EMERGENCY MEDICINE

## 2025-03-01 PROCEDURE — 94640 AIRWAY INHALATION TREATMENT: CPT

## 2025-03-01 RX ORDER — ACETAMINOPHEN 325 MG/1
650 TABLET ORAL
Status: COMPLETED | OUTPATIENT
Start: 2025-03-01 | End: 2025-03-01

## 2025-03-01 RX ORDER — OSELTAMIVIR PHOSPHATE 75 MG/1
75 CAPSULE ORAL
Status: COMPLETED | OUTPATIENT
Start: 2025-03-01 | End: 2025-03-01

## 2025-03-01 RX ORDER — BENZONATATE 200 MG/1
200 CAPSULE ORAL 3 TIMES DAILY PRN
Qty: 30 CAPSULE | Refills: 0 | Status: SHIPPED | OUTPATIENT
Start: 2025-03-01 | End: 2025-03-11

## 2025-03-01 RX ORDER — IPRATROPIUM BROMIDE AND ALBUTEROL SULFATE 2.5; .5 MG/3ML; MG/3ML
3 SOLUTION RESPIRATORY (INHALATION)
Status: COMPLETED | OUTPATIENT
Start: 2025-03-01 | End: 2025-03-01

## 2025-03-01 RX ORDER — OSELTAMIVIR PHOSPHATE 75 MG/1
75 CAPSULE ORAL 2 TIMES DAILY
Qty: 10 CAPSULE | Refills: 0 | Status: SHIPPED | OUTPATIENT
Start: 2025-03-01 | End: 2025-03-06

## 2025-03-01 RX ADMIN — OSELTAMIVIR PHOSPHATE 75 MG: 75 CAPSULE ORAL at 11:03

## 2025-03-01 RX ADMIN — IPRATROPIUM BROMIDE AND ALBUTEROL SULFATE 3 ML: .5; 3 SOLUTION RESPIRATORY (INHALATION) at 11:03

## 2025-03-01 RX ADMIN — ACETAMINOPHEN 650 MG: 325 TABLET, FILM COATED ORAL at 10:03

## 2025-03-01 NOTE — ED PROVIDER NOTES
"Encounter Date: 3/1/2025       History     Chief Complaint   Patient presents with    Generalized Body Aches     Body aches, fever, cough, nausea and wheezing since yesterday..     The history is provided by the patient.   Cough  This is a new problem. The current episode started yesterday. The problem has been unchanged. The cough is Non-productive. The maximum temperature recorded prior to her arrival was 100 - 100.9 F. The fever has been present for Less than 1 day. Associated symptoms include chills, myalgias and wheezing. Pertinent negatives include no chest pain, no sore throat and no shortness of breath. Her past medical history is significant for COPD.   Reports she recently babysat her great grandchildren who were ill.    Review of patient's allergies indicates:   Allergen Reactions    Iodine Swelling    Shellfish containing products Swelling    Metronidazole     Penicillins      Other reaction(s): sulfa drugs    Sulfa (sulfonamide antibiotics)      C/o that it makes her feel shortwinded, "smothered"     Past Medical History:   Diagnosis Date    Arthritis     COPD (chronic obstructive pulmonary disease)     Dementia     Depression     Hypertension     RLS (restless legs syndrome)     Thyroid disease      Past Surgical History:   Procedure Laterality Date    APPENDECTOMY      CATARACT EXTRACTION      CHOLECYSTECTOMY      COLONOSCOPY      COLONOSCOPY N/A 2/2/2023    Procedure: COLONOSCOPY;  Surgeon: Lonnie Dial III, MD;  Location: [facility];  Service: Endoscopy;  Laterality: N/A;  suboptimal prep   tortuous colon   diverticular disease without perf and without bleeding    HYSTERECTOMY      SKIN CANCER EXCISION      TONSILLECTOMY       Family History   Problem Relation Name Age of Onset    Heart disease Mother      Heart disease Father       Social History[1]  Review of Systems   Constitutional:  Positive for chills. Negative for fever.   HENT:  Negative for sore throat.    Respiratory:  Positive for " cough and wheezing. Negative for shortness of breath.    Cardiovascular:  Negative for chest pain.   Gastrointestinal:  Negative for nausea.   Genitourinary:  Negative for dysuria.   Musculoskeletal:  Positive for myalgias. Negative for back pain.   Skin:  Negative for rash.   Neurological:  Negative for weakness.   Hematological:  Does not bruise/bleed easily.       Physical Exam     Initial Vitals [03/01/25 1002]   BP Pulse Resp Temp SpO2   (!) 155/71 (!) 59 18 100.1 °F (37.8 °C) (!) 93 %      MAP       --         Physical Exam    Nursing note and vitals reviewed.  Constitutional: She appears well-developed and well-nourished.   HENT:   Head: Normocephalic and atraumatic.   Right Ear: External ear normal.   Left Ear: External ear normal.   Eyes: Conjunctivae and EOM are normal. Pupils are equal, round, and reactive to light.   Neck: Neck supple.   Normal range of motion.  Cardiovascular:  Normal rate, regular rhythm, normal heart sounds and intact distal pulses.           Pulmonary/Chest: She has wheezes in the right middle field, the right lower field, the left middle field and the left lower field.   Abdominal: Abdomen is soft. Bowel sounds are normal.   Musculoskeletal:         General: Normal range of motion.      Cervical back: Normal range of motion and neck supple.     Neurological: She is alert and oriented to person, place, and time. GCS score is 15. GCS eye subscore is 4. GCS verbal subscore is 5. GCS motor subscore is 6.   Skin: Skin is warm and dry. Capillary refill takes less than 2 seconds.   Psychiatric: She has a normal mood and affect. Her behavior is normal. Judgment and thought content normal.         ED Course   Procedures  Labs Reviewed   COVID/RSV/FLU A&B PCR - Abnormal       Result Value    Influenza A PCR Detected (*)     Influenza B PCR Not Detected      Respiratory Syncytial Virus PCR Not Detected      SARS-CoV-2 PCR Not Detected      Narrative:     The Xpert Xpress SARS-CoV-2/FLU/RSV  plus is a rapid, multiplexed real-time PCR test intended for the simultaneous qualitative detection and differentiation of SARS-CoV-2, Influenza A, Influenza B, and respiratory syncytial virus (RSV) viral RNA in either nasopharyngeal swab or nasal swab specimens.                Imaging Results    None          Medications   acetaminophen tablet 650 mg (650 mg Oral Given 3/1/25 1010)   albuterol-ipratropium 2.5 mg-0.5 mg/3 mL nebulizer solution 3 mL (3 mLs Nebulization Given 3/1/25 1134)   oseltamivir capsule 75 mg (75 mg Oral Given 3/1/25 1145)     Medical Decision Making  Amount and/or Complexity of Data Reviewed  Labs: ordered. Decision-making details documented in ED Course.    Risk  OTC drugs.  Prescription drug management.    Differential includes:  flu, COVID, RSV, pneumonia, COPD exacerbation, CHF.  Will swab for flu/COVID/RSV and give DuoNeb for wheezing.           ED Course as of 03/01/25 1152   Sat Mar 01, 2025   1151 Feeling much better after neb treatment.   Stable for D/C [CL]      ED Course User Index  [CL] Gustabo Price MD                           Clinical Impression:  Final diagnoses:  [J10.1] Influenza A (Primary)  [J44.1] COPD with acute exacerbation          ED Disposition Condition    Discharge Stable          ED Prescriptions       Medication Sig Dispense Start Date End Date Auth. Provider    oseltamivir (TAMIFLU) 75 MG capsule Take 1 capsule (75 mg total) by mouth 2 (two) times daily. for 5 days 10 capsule 3/1/2025 3/6/2025 Gustabo Price MD    benzonatate (TESSALON) 200 MG capsule Take 1 capsule (200 mg total) by mouth 3 (three) times daily as needed for Cough. 30 capsule 3/1/2025 3/11/2025 Gustabo Price MD          Follow-up Information       Follow up With Specialties Details Why Contact Info    Britt Fraire MD Family Medicine Schedule an appointment as soon as possible for a visit   [address] [place] [address] [facility] [address] [phone]                  [1]   Social History  Tobacco Use    Smoking status: Every Day     Current packs/day: 0.50     Types: Cigarettes    Smokeless tobacco: Never   Substance Use Topics    Alcohol use: Not Currently        Gustabo Price MD  03/01/25 2658

## 2025-03-11 ENCOUNTER — HOSPITAL ENCOUNTER (OUTPATIENT)
Dept: RADIOLOGY | Facility: HOSPITAL | Age: 80
Discharge: HOME | End: 2025-03-11
Attending: FAMILY MEDICINE
Payer: MEDICARE

## 2025-03-11 DIAGNOSIS — J44.1 COPD WITH EXACERBATION: ICD-10-CM

## 2025-03-11 PROCEDURE — 71046 X-RAY EXAM CHEST 2 VIEWS: CPT | Mod: TC

## 2025-04-11 ENCOUNTER — LAB VISIT (OUTPATIENT)
Dept: LAB | Facility: HOSPITAL | Age: 80
End: 2025-04-11
Attending: FAMILY MEDICINE
Payer: MEDICARE

## 2025-04-11 DIAGNOSIS — M79.10 MYALGIA: Primary | ICD-10-CM

## 2025-04-11 LAB
ALBUMIN SERPL-MCNC: 4 G/DL (ref 3.4–4.8)
ALBUMIN/GLOB SERPL: 1.1 RATIO (ref 1.1–2)
ALP SERPL-CCNC: 67 UNIT/L (ref 40–150)
ALT SERPL-CCNC: 20 UNIT/L (ref 0–55)
ANION GAP SERPL CALC-SCNC: 10 MEQ/L
AST SERPL-CCNC: 17 UNIT/L (ref 11–45)
BASOPHILS # BLD AUTO: 0.13 X10(3)/MCL
BASOPHILS NFR BLD AUTO: 1.1 %
BILIRUB SERPL-MCNC: 0.4 MG/DL
BUN SERPL-MCNC: 25 MG/DL (ref 9.8–20.1)
CALCIUM SERPL-MCNC: 9.6 MG/DL (ref 8.4–10.2)
CHLORIDE SERPL-SCNC: 102 MMOL/L (ref 98–107)
CK SERPL-CCNC: 174 U/L (ref 29–168)
CO2 SERPL-SCNC: 21 MMOL/L (ref 23–31)
CREAT SERPL-MCNC: 0.96 MG/DL (ref 0.55–1.02)
CREAT/UREA NIT SERPL: 26
CRP SERPL-MCNC: 3.9 MG/L
EOSINOPHIL # BLD AUTO: 0.39 X10(3)/MCL (ref 0–0.9)
EOSINOPHIL NFR BLD AUTO: 3.4 %
ERYTHROCYTE [DISTWIDTH] IN BLOOD BY AUTOMATED COUNT: 14.6 % (ref 11.5–17)
ERYTHROCYTE [SEDIMENTATION RATE] IN BLOOD: 19 MM/HR (ref 0–20)
GFR SERPLBLD CREATININE-BSD FMLA CKD-EPI: 60 ML/MIN/1.73/M2
GLOBULIN SER-MCNC: 3.7 GM/DL (ref 2.4–3.5)
GLUCOSE SERPL-MCNC: 110 MG/DL (ref 82–115)
HCT VFR BLD AUTO: 41.3 % (ref 37–47)
HGB BLD-MCNC: 13.7 G/DL (ref 12–16)
IMM GRANULOCYTES # BLD AUTO: 0.07 X10(3)/MCL (ref 0–0.04)
IMM GRANULOCYTES NFR BLD AUTO: 0.6 %
LYMPHOCYTES # BLD AUTO: 2.52 X10(3)/MCL (ref 0.6–4.6)
LYMPHOCYTES NFR BLD AUTO: 21.8 %
MAGNESIUM SERPL-MCNC: 2 MG/DL (ref 1.6–2.6)
MCH RBC QN AUTO: 28.5 PG (ref 27–31)
MCHC RBC AUTO-ENTMCNC: 33.2 G/DL (ref 33–36)
MCV RBC AUTO: 86 FL (ref 80–94)
MONOCYTES # BLD AUTO: 0.97 X10(3)/MCL (ref 0.1–1.3)
MONOCYTES NFR BLD AUTO: 8.4 %
NEUTROPHILS # BLD AUTO: 7.47 X10(3)/MCL (ref 2.1–9.2)
NEUTROPHILS NFR BLD AUTO: 64.7 %
NRBC BLD AUTO-RTO: 0 %
PHOSPHATE SERPL-MCNC: 3.3 MG/DL (ref 2.3–4.7)
PLATELET # BLD AUTO: 304 X10(3)/MCL (ref 130–400)
PMV BLD AUTO: 9.4 FL (ref 7.4–10.4)
POTASSIUM SERPL-SCNC: 4.1 MMOL/L (ref 3.5–5.1)
PROT SERPL-MCNC: 7.7 GM/DL (ref 5.8–7.6)
RBC # BLD AUTO: 4.8 X10(6)/MCL (ref 4.2–5.4)
RHEUMATOID FACT SERPL-ACNC: <13 IU/ML
SODIUM SERPL-SCNC: 133 MMOL/L (ref 136–145)
WBC # BLD AUTO: 11.55 X10(3)/MCL (ref 4.5–11.5)

## 2025-04-11 PROCEDURE — 86431 RHEUMATOID FACTOR QUANT: CPT

## 2025-04-11 PROCEDURE — 82085 ASSAY OF ALDOLASE: CPT

## 2025-04-11 PROCEDURE — 36415 COLL VENOUS BLD VENIPUNCTURE: CPT

## 2025-04-11 PROCEDURE — 86140 C-REACTIVE PROTEIN: CPT

## 2025-04-11 PROCEDURE — 80053 COMPREHEN METABOLIC PANEL: CPT

## 2025-04-11 PROCEDURE — 83735 ASSAY OF MAGNESIUM: CPT

## 2025-04-11 PROCEDURE — 85025 COMPLETE CBC W/AUTO DIFF WBC: CPT

## 2025-04-11 PROCEDURE — 85652 RBC SED RATE AUTOMATED: CPT

## 2025-04-11 PROCEDURE — 82550 ASSAY OF CK (CPK): CPT

## 2025-04-11 PROCEDURE — 84100 ASSAY OF PHOSPHORUS: CPT

## 2025-04-13 LAB — ALDOLASE SERPL-CCNC: 4.4 U/L

## (undated) DEVICE — KIT SURGICAL COLON .25 1.1OZ